# Patient Record
Sex: MALE | Race: BLACK OR AFRICAN AMERICAN | Employment: FULL TIME | URBAN - METROPOLITAN AREA
[De-identification: names, ages, dates, MRNs, and addresses within clinical notes are randomized per-mention and may not be internally consistent; named-entity substitution may affect disease eponyms.]

---

## 2021-01-29 ENCOUNTER — HOSPITAL ENCOUNTER (EMERGENCY)
Age: 46
Discharge: HOME | End: 2021-01-29
Payer: COMMERCIAL

## 2021-01-29 VITALS
HEIGHT: 74 IN | WEIGHT: 195 LBS | TEMPERATURE: 97.7 F | SYSTOLIC BLOOD PRESSURE: 145 MMHG | HEART RATE: 83 BPM | OXYGEN SATURATION: 99 % | RESPIRATION RATE: 16 BRPM | DIASTOLIC BLOOD PRESSURE: 80 MMHG | BODY MASS INDEX: 25.03 KG/M2

## 2021-01-29 DIAGNOSIS — R52 BODY ACHES: ICD-10-CM

## 2021-01-29 DIAGNOSIS — Z20.822 SUSPECTED COVID-19 VIRUS INFECTION: ICD-10-CM

## 2021-01-29 DIAGNOSIS — R05.9 COUGH: Primary | ICD-10-CM

## 2021-01-29 PROCEDURE — 99283 EMERGENCY DEPT VISIT LOW MDM: CPT

## 2021-01-29 PROCEDURE — U0003 INFECTIOUS AGENT DETECTION BY NUCLEIC ACID (DNA OR RNA); SEVERE ACUTE RESPIRATORY SYNDROME CORONAVIRUS 2 (SARS-COV-2) (CORONAVIRUS DISEASE [COVID-19]), AMPLIFIED PROBE TECHNIQUE, MAKING USE OF HIGH THROUGHPUT TECHNOLOGIES AS DESCRIBED BY CMS-2020-01-R: HCPCS

## 2021-01-29 ASSESSMENT — PAIN SCALES - GENERAL: PAINLEVEL_OUTOF10: 5

## 2021-01-29 NOTE — ED PROVIDER NOTES
[facility]        Pt Name: Shae Miller  MRN: [id number]  [place] 1975  Date of evaluation: 1/29/2021  Provider: Hiren Monterroso PA-C  PCP: No primary care provider on file. DEONNA. I have evaluated this patient. My supervising physician was available for consultation. CHIEF COMPLAINT       Chief Complaint   Patient presents with    Headache     From home. Headache and overall bodyaches, cough since yesterday morning. Contact with friend positive for covid last Sunday. HISTORY OF PRESENT ILLNESS   (Location, Timing/Onset, Context/Setting, Quality, Duration, Modifying Factors, Severity, Associated Signs and Symptoms)  Note limiting factors. Shae Mliler is a 54 y.o. male that presents to the emergency department with a chief complaint of tactile fevers, body aches, headache and cough that began yesterday. Was around a family member who tested positive for Covid yesterday and they were around him 5 days ago. Patient denies any history of CHF, COPD. Denies nausea, vomiting, shortness of breath abdominal pain, dysuria, hematuria, diarrhea, bloody stool, rash or any other symptoms. Nursing Notes were all reviewed and agreed with or any disagreements were addressed in the HPI. REVIEW OF SYSTEMS    (2-9 systems for level 4, 10 or more for level 5)     Review of Systems    Positives and Pertinent negatives as per HPI. Except as noted above in the ROS, all other systems were reviewed and negative. PAST MEDICAL HISTORY   History reviewed. No pertinent past medical history.       SURGICAL HISTORY     Past Surgical History:   Procedure Laterality Date    HAND SURGERY      tendon repair left hand inj         CURRENTMEDICATIONS       Previous Medications    CYCLOBENZAPRINE (FLEXERIL) 10 MG TABLET    Take 1 tablet by mouth 3 times daily as needed for Muscle spasms    NAPROXEN (NAPROSYN) 500 MG TABLET    Take 1 tablet

## 2021-01-29 NOTE — LETTER
[facility] Emergency Department  [address] [id number]  Phone: [phone]               January 29, 2021    Patient: Dulce Santos   YOB: 1975   Date of Visit: 1/29/2021       To Whom It May Concern:    Dulce Santos was seen and treated in our emergency department on 1/29/2021. He may return to work on reciept of a negative test. If test is positive will need to isolate for 14 days from time of symptoms starting.       Sincerely,       Mk Lombardi RN         Signature:__________________________________

## 2021-01-30 ENCOUNTER — CARE COORDINATION (OUTPATIENT)
Dept: CARE COORDINATION | Age: 46
End: 2021-01-30

## 2021-01-30 LAB — SARS-COV-2, PCR: DETECTED

## 2021-01-30 NOTE — PROGRESS NOTES
Jack Yu   54 y.o. male   1975    Virtual visit encounter type:   [] Doxy. me  [x] Telephone w/o video  [] MyChart  [] Other: This is patient's first visit with me. Trish Castro is here to establish care as their new PCP. Reasons for visit:  Chief Complaint   Patient presents with   Ruthy Trent New Doctor    Positive For Covid-19     Tested positive 1/29. Trish Castro has a PMH significant for: none    There is no problem list on file for this patient. HPI:    Patient was referred from [facility] ER after having been seen there on 1/29/2021. His vital signs were remarkable for elevated BP of 145/80 and mild tachycardia of 104 bpm.  He was afebrile with normal respiratory rate and O2 saturation 99% on room air. He did not appear acutely ill or appear in any signs of respiratory distress. No imaging or lab work of any kind was done. He was not discharged on any medications. He stated that he was exposed to COVID-19 when he went to his friend's house around Jan 24th. His friend ended up testing positive 2 days later. Patient's symptoms started on Jan 28th, which consisted of a mild cough, malaise, headache, and subjective fever. He doesn't have a thermometer at home. He stated he went to an urgent care and from what it sounds like he was not prescribed anything. He works at [employer] [address]. He did show up to work when he was sick. He later informed his manager about his COVID-19 status. Besides himself in his household is his 1year old son. The biological mother does not live with them. He has no other children. He stated that he overall feels much better compared to yesterday. He is only taking Tylenol PRN for fever. His 1year old son is overall doing well except for nasal congestion. Both have good appetite and drinking well.     Recent noteworthy labs:   -Tested positive for COVID-19 on 1/29/2021    PDM report: -Revealed no controlled medications written of any kind. No Known Allergies    No current outpatient medications on file prior to visit. No current facility-administered medications on file prior to visit. History reviewed. No pertinent family history. Social History     Tobacco Use    Smoking status: Never Smoker    Smokeless tobacco: Never Used   Substance Use Topics    Alcohol use: No        No results for input(s): WBC, HGB, HCT, MCV, PLT in the last 720 hours. Chemistry    No results found for: NA, K, CL, CO2, BUN, CREATININE No results found for: CALCIUM, ALKPHOS, AST, ALT, BILITOT       No results found for: ALT, AST, GGT, ALKPHOS, BILITOT  No results found for: LABA1C  No results found for: EAG    Review of Systems   Constitutional: Positive for activity change and fever. Negative for appetite change, chills, fatigue and unexpected weight change. HENT: Positive for congestion, rhinorrhea and sinus pressure. Negative for mouth sores, postnasal drip, sore throat, trouble swallowing and voice change. Respiratory: Positive for cough. Negative for chest tightness, shortness of breath and wheezing. Cardiovascular: Negative for chest pain, palpitations and leg swelling. Gastrointestinal: Negative for abdominal distention, abdominal pain, blood in stool, constipation, diarrhea, nausea and vomiting. Endocrine: Negative for cold intolerance and heat intolerance. Genitourinary: Negative for difficulty urinating, dysuria, frequency and hematuria. Musculoskeletal: Positive for arthralgias and myalgias. Negative for back pain. Skin: Negative for color change and rash. Neurological: Positive for headaches. Negative for dizziness, weakness, light-headedness and numbness. Negative for change/loss of taste. Negative for change/loss of smell. Psychiatric/Behavioral: Negative for sleep disturbance.      Wt Readings from Last 3 Encounters:   01/29/21 195 lb (88.5 kg) 08/15/17 205 lb (93 kg)       BP Readings from Last 3 Encounters:   01/29/21 (!) 145/80   08/15/17 137/80       Pulse Readings from Last 3 Encounters:   01/29/21 83   08/15/17 57       There were no vitals taken for this visit. Physical Exam  Unable to assess due to telephone w/o video encounter only    Assessment/Plan:   Jack was seen today for established new doctor and positive for covid-19. Diagnoses and all orders for this visit:    Encounter to establish care with new doctor    COVID-19 virus infection  -     vitamin D (ERGOCALCIFEROL) 1.25 MG (21031 UT) CAPS capsule; Take 1 capsule by mouth every 7 days  -     benzonatate (TESSALON) 100 MG capsule; Take 1 capsule by mouth 3 times daily as needed for Cough  -     brompheniramine-pseudoephedrine-DM 2-30-10 MG/5ML syrup; Take 5 mLs by mouth 4 times daily as needed for Congestion or Cough    Educated about COVID-19 virus infection       I reviewed the plan of care with the patient. Patient acknowledged understanding and agreed with plan of care overall.     Medications Discontinued During This Encounter   Medication Reason    cyclobenzaprine (FLEXERIL) 10 MG tablet LIST CLEANUP    naproxen (NAPROSYN) 500 MG tablet LIST CLEANUP Martha Frankel is a 54 y.o. male is being evaluated by a virtual visit (video visit) and/or telephone (without video) encounter to address their concern(s) as mentioned above. Prior to arranging this appointment, the patient was made aware of the need and importance to schedule the visit in this manner given the current ongoing COVID-19 pandemic. The patient was also made aware that the telemedicine service used (e.g.doxy. me) would not save let alone record any information (audio, video, and text) regarding the actual virtual visit. After this discussion, the patient acknowledged understanding and agreed to today's virtual visit encounter. A caregiver was present when appropriate as well as an  if requested. Due to this being a TeleHealth encounter (during the [facility]- public health emergency), evaluation of the following organ systems was overall limited: Vitals/Constitutional/EENT/Resp/CV/GI//MS/Neuro/Skin/Heme-Lymph-Imm. As a result, establishing a diagnosis(s) and formulating a plan of care may be overall more difficult and complicated compared to a conventional (face-to-face) office visit. The patient was made aware about the potential limitations and difficulties of a telemedicine visit such as having technical difficulties related to video and/or audio issues often stemming from a sub-optimal/poor Internet or cellular data connection and/or other factors. If this is judged to be the case then the patient would be informed if deemed relevant and necessary that an in-person follow-up visit may be recommended. Pursuant to the emergency declaration under the [facility], [address] [place] [address] authority and the [employer] Act, this virtual visit was conducted with the patient's (and/or legal guardian's) consent, to reduce the patient's risk (as well as others) of exposure to COVID-19 and to continue to maintain and provide necessary medical care. The patient (and/or legal guardian) has also been advised to contact this office for worsening conditions or problems, and seek emergency medical treatment and/or call 911 if deemed necessary. Services were provided through either a video synchronous discussion virtually or via telephone (without video) or combination of both to substitute for in-person clinic visit. Patient and provider were located at their individual home(s) or their most convenient location at the time of visit. Regarding my note: This note was composed to the best of my knowledge and recollection of the encounter with the patient using one of my own customized note templates utilizing a combination of typing and dictating with the [address] [place] [address] speech recognition software. As a result, the note may possibly have various errors (e.g. spelling, grammar, and non-sensible words/phrases/statements) despite reviewing the note prior to signing it for completion. It is worth noting that most of the time, notes are completed usually long after the patient is seen and are strived to be completed in a timely fashion (ideally within 72 hours of the patient encounter). It is also worth noting that healthcare providers often see several patients a day (e.g. > 15-30 patients/day) in either the outpatient and/or inpatient setting(s). In addition, healthcare providers spend a significant amount of time reviewing multiple patients' charts in preparation for their future encounters (pre-charting) as well as time spent reviewing any labs, imaging, specialist's notes (if relevant), and other documents (e.g. recent ER visits or hospital stays or completing forms such as FMLA, short-term/long-term disability), etc.  Time and effort is also allocated to coordinating with office staff to make sure various things are completed as part of the day-to-day office management responsibilities. Given all this, it is worth pointing out that not every detail can be remembered and not everything discussed is considered relevant, significant, or noteworthy. If one has any questions or concerns about my given note, please take into consideration all these aforementioned things before contacting. Reggie Kellogg M.D.   [id number]    Electronically signed by Bita Thakur on 2/2/2021 at 10:10 AM.

## 2021-01-30 NOTE — CARE COORDINATION
[facility]". Discussed COVID-19 related testing which was available at this time. Test results were positive. Patient informed of results, if available? Yes    Care Transition Nurse/ Ambulatory Care Manager contacted the patient by telephone to perform post discharge assessment. Call within 2 business days of discharge: Yes. Verified name and  with patient as identifiers. Provided introduction to self, and explanation of the CTN/ACM role, and reason for call due to risk factors for infection and/or exposure to COVID-19. Symptoms reviewed with patient who verbalized the following symptoms: pain or aching joints, less urine output and headache. Due to no new or worsening symptoms encounter was not routed to provider for escalation. Discussed follow-up appointments. If no appointment was previously scheduled, appointment scheduling offered: Yes  [facility] follow up appointment(s): No future appointments. Non-[facility] follow up appointment(s):     3 way call to patient/ [facility] to establish w PCP and schedule follow up appt from ER    Non-face-to-face services provided:  Scheduled appointment with PCP-per 3 way call to [facility]  Obtained and reviewed discharge summary and/or continuity of care documents  Reviewed and followed up on pending diagnostic tests and treatments-covid  Education of patient/family/caregiver/guardian to support self-management-cdc guidelines, loop enrollment     Advance Care Planning:   Does patient have an Advance Directive:  not on file. Patient has following risk factors of: no known risk factors. CTN/ACM reviewed discharge instructions, medical action plan and red flags such as increased shortness of breath, increasing fever and signs of decompensation with patient who verbalized understanding. Discussed exposure protocols and quarantine with CDC Guidelines What to do if you are sick with coronavirus disease .  Patient was given an

## 2021-01-30 NOTE — CARE COORDINATION
Patient was seen in ED on 2021 for headache, body ache, and cough. His son was also seen in the ED. They were exposed to a COVID-19 positive person. EMERGENCY DEPARTMENT COURSE and DIFFERENTIAL DIAGNOSIS/MDM:  Patient presented with cough, body aches with recent Covid exposure. Son has similar symptoms is here with him. Has a CMT COVID-19 risk of decompensation score within 24 hours of 0. Do not believe any work-up here besides Covid swab is warranted. Lung sounds are clear to auscultation. Low suspicion for pulmonary embolus, pneumothorax, bacterial pneumonia or other emergent etiology. Was educated on symptomatic treatment at home. Return here for any worsening of symptoms or problems at home. FINAL IMPRESSION       1. Cough    2. Body aches    3. Suspected COVID-19 virus infection      Phoned Patient for ED follow up/COVID precautions. Patient contacted regarding COVID-19 exposure. Discussed COVID-19 related testing which was pending at this time. Test results were pending. Patient informed of results, if available? pending    Care Transition Nurse/ Ambulatory Care Manager contacted the patient by telephone to perform post discharge assessment. Call within 2 business days of discharge: Yes. Verified name and  with patient as identifiers. Provided introduction to self, and explanation of the CTN/ACM role, and reason for call due to risk factors for infection and/or exposure to COVID-19. Symptoms reviewed with patient who verbalized the following symptoms: pain or aching joints and complains of body aches and headache. Due to no new or worsening symptoms encounter was not routed to provider for escalation. Discussed follow-up appointments. If no appointment was previously scheduled, appointment scheduling offered: No and Patient doesn't have a PCP. He was given [employer] scheduling assistance phone number, [phone], to schedule a new Patient appointment once out of quarantine. [facility] No future appointments. Non-[facility] follow up appointment(s):     Non-face-to-face services provided:  Obtained and reviewed discharge summary and/or continuity of care documents  Patient given information for [address]:   Does patient have an Advance Directive:  patient declined education. He doesn't feel well at this time. Patient has following risk factors of: no known risk factors. CTN/ACM reviewed discharge instructions, medical action plan and red flags such as increased shortness of breath, increasing fever and signs of decompensation with patient who verbalized understanding. Discussed exposure protocols and quarantine with CDC Guidelines What to do if you are sick with coronavirus disease 2019.  Patient was given an opportunity for questions and concerns. The patient agrees to contact the [employer] exposure line [phone], [facility] department [place] [facility]: ([phone]) and PCP office for questions related to their healthcare. CTN/ACM provided contact information for future needs. Reviewed and educated patient on any new and changed medications related to discharge diagnosis     Patient/family/caregiver given information for [facility] Loop and agrees to enroll yes  Patient's preferred e-mail: [email or web address]   Patient's preferred phone number: [phone]  Based on Loop alert triggers, patient will be contacted by nurse care manager for worsening symptoms. Pt will be further monitored by COVID Loop Team based on severity of symptoms and risk factors.

## 2021-02-01 ENCOUNTER — VIRTUAL VISIT (OUTPATIENT)
Dept: FAMILY MEDICINE CLINIC | Age: 46
End: 2021-02-01

## 2021-02-01 DIAGNOSIS — Z76.89 ENCOUNTER TO ESTABLISH CARE WITH NEW DOCTOR: Primary | ICD-10-CM

## 2021-02-01 DIAGNOSIS — U07.1 COVID-19 VIRUS INFECTION: ICD-10-CM

## 2021-02-01 DIAGNOSIS — Z71.89 EDUCATED ABOUT COVID-19 VIRUS INFECTION: ICD-10-CM

## 2021-02-01 PROCEDURE — 99442 PR PHYS/QHP TELEPHONE EVALUATION 11-20 MIN: CPT | Performed by: FAMILY MEDICINE

## 2021-02-01 RX ORDER — BENZONATATE 100 MG/1
100 CAPSULE ORAL 3 TIMES DAILY PRN
Qty: 15 CAPSULE | Refills: 1 | Status: SHIPPED | OUTPATIENT
Start: 2021-02-01

## 2021-02-01 RX ORDER — ERGOCALCIFEROL 1.25 MG/1
50000 CAPSULE ORAL
Qty: 4 CAPSULE | Refills: 2 | Status: SHIPPED | OUTPATIENT
Start: 2021-02-01

## 2021-02-01 RX ORDER — BROMPHENIRAMINE MALEATE, PSEUDOEPHEDRINE HYDROCHLORIDE, AND DEXTROMETHORPHAN HYDROBROMIDE 2; 30; 10 MG/5ML; MG/5ML; MG/5ML
5 SYRUP ORAL 4 TIMES DAILY PRN
Qty: 473 ML | Refills: 0 | Status: SHIPPED | OUTPATIENT
Start: 2021-02-01

## 2021-02-01 ASSESSMENT — ENCOUNTER SYMPTOMS
NAUSEA: 0
COUGH: 1
SINUS PRESSURE: 1
SHORTNESS OF BREATH: 0
VOMITING: 0
CHEST TIGHTNESS: 0
RHINORRHEA: 1
DIARRHEA: 0

## 2021-02-01 ASSESSMENT — PATIENT HEALTH QUESTIONNAIRE - PHQ9
2. FEELING DOWN, DEPRESSED OR HOPELESS: 0
SUM OF ALL RESPONSES TO PHQ9 QUESTIONS 1 & 2: 0
SUM OF ALL RESPONSES TO PHQ QUESTIONS 1-9: 0
1. LITTLE INTEREST OR PLEASURE IN DOING THINGS: 0

## 2021-02-02 ASSESSMENT — ENCOUNTER SYMPTOMS
BLOOD IN STOOL: 0
ABDOMINAL PAIN: 0
COLOR CHANGE: 0
TROUBLE SWALLOWING: 0
VOICE CHANGE: 0
ABDOMINAL DISTENTION: 0
CONSTIPATION: 0
WHEEZING: 0
BACK PAIN: 0
SORE THROAT: 0

## 2021-02-11 ENCOUNTER — TELEPHONE (OUTPATIENT)
Dept: FAMILY MEDICINE CLINIC | Age: 46
End: 2021-02-11

## 2021-02-11 NOTE — TELEPHONE ENCOUNTER
Pt called stating he went to [facility] on 2/9/21 to have covid test done to return to work and it came back positive. Initial positive was on 1/29/21. Pt states his employer will not let him return to work with a positive result unless a doctor note is submitted. Pt states he has no symptoms at all and he feels fine to return to work. He also states that the home where he was previously exposed are all testing negative now. Please advise, should pt remain quarantined or ok to return to work.

## 2021-07-28 ENCOUNTER — APPOINTMENT (OUTPATIENT)
Dept: GENERAL RADIOLOGY | Age: 46
End: 2021-07-28
Payer: COMMERCIAL

## 2021-07-28 ENCOUNTER — HOSPITAL ENCOUNTER (EMERGENCY)
Age: 46
Discharge: HOME | End: 2021-07-28
Payer: COMMERCIAL

## 2021-07-28 VITALS
SYSTOLIC BLOOD PRESSURE: 163 MMHG | HEART RATE: 73 BPM | BODY MASS INDEX: 26.73 KG/M2 | TEMPERATURE: 97.7 F | WEIGHT: 208.3 LBS | OXYGEN SATURATION: 99 % | HEIGHT: 74 IN | DIASTOLIC BLOOD PRESSURE: 67 MMHG | RESPIRATION RATE: 18 BRPM

## 2021-07-28 DIAGNOSIS — S93.491A SPRAIN OF ANTERIOR TALOFIBULAR LIGAMENT OF RIGHT ANKLE, INITIAL ENCOUNTER: Primary | ICD-10-CM

## 2021-07-28 PROCEDURE — 6370000000 HC RX 637 (ALT 250 FOR IP): Performed by: PHYSICIAN ASSISTANT

## 2021-07-28 PROCEDURE — 99283 EMERGENCY DEPT VISIT LOW MDM: CPT

## 2021-07-28 PROCEDURE — 73610 X-RAY EXAM OF ANKLE: CPT

## 2021-07-28 RX ORDER — NAPROXEN 250 MG/1
500 TABLET ORAL ONCE
Status: COMPLETED | OUTPATIENT
Start: 2021-07-28 | End: 2021-07-28

## 2021-07-28 RX ORDER — NAPROXEN 500 MG/1
500 TABLET ORAL 2 TIMES DAILY PRN
Qty: 20 TABLET | Refills: 0 | Status: SHIPPED | OUTPATIENT
Start: 2021-07-28 | End: 2021-08-07

## 2021-07-28 RX ADMIN — NAPROXEN 500 MG: 250 TABLET ORAL at 13:26

## 2021-07-28 ASSESSMENT — PAIN SCALES - GENERAL
PAINLEVEL_OUTOF10: 5
PAINLEVEL_OUTOF10: 5

## 2021-07-28 ASSESSMENT — PAIN DESCRIPTION - ORIENTATION: ORIENTATION: RIGHT

## 2021-07-28 ASSESSMENT — ENCOUNTER SYMPTOMS
NAUSEA: 0
VOMITING: 0
CHEST TIGHTNESS: 0
DIARRHEA: 0
SHORTNESS OF BREATH: 0
ABDOMINAL PAIN: 0

## 2021-07-28 ASSESSMENT — PAIN DESCRIPTION - LOCATION: LOCATION: ANKLE

## 2021-07-28 NOTE — ED PROVIDER NOTES
[address] [facility]        Pt Name: Diann Jean-Baptiste  MRN: [id number]  [place] 1975  Date of evaluation: 7/28/2021  Provider: Breann Jamison PA-C  PCP: Peter Pak  Note Started: 1:22 PM EDT       DEONNA. I have evaluated this patient. My supervising physician was available for consultation. CHIEF COMPLAINT       Chief Complaint   Patient presents with    Ankle Pain     Reports twisting right ankle between stairs while at work  today       HISTORY OF PRESENT ILLNESS   (Location, Timing/Onset, Context/Setting, Quality, Duration, Modifying Factors, Severity, Associated Signs and Symptoms)  Note limiting factors. Chief Complaint: Right ankle injury    Diann Jean-Baptiste is a 51 y.o. male who presents to the emergency department with reports of an injury accident that occurred today at work. Patient is an [address] . 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 He states he was coming out of the steps from the side door and sustained an inversion mechanism injury. He did not fall the ground he did not hit his head he did not blackout or lose consciousness and this is an isolated complaint of his right ankle. He has had increasing levels of pain and some swelling. He reports to me he is able to independently ambulate but does so with discomfort. He contacted his supervisor who suggested he come to the ED for evaluation and treatment. His current level of pain and discomfort of the lateral complex of the right ankle rates to be 5 out of 10. He is done nothing yet for the above-mentioned secondary the acuity injury. He states that this is an isolated complaint of the lateral complex of the right ankle and presents the ED for evaluation and treatment of the above-mentioned. Nursing Notes were all reviewed and agreed with or any disagreements were addressed in the HPI.     REVIEW OF SYSTEMS    (2-9 systems for level 4, 10 or more for level 5)     Review of Systems   Constitutional: Negative for activity change, chills and fever. Respiratory: Negative for chest tightness and shortness of breath. Cardiovascular: Negative for chest pain. Gastrointestinal: Negative for abdominal pain, diarrhea, nausea and vomiting. Genitourinary: Negative for dysuria and flank pain. Musculoskeletal: Positive for arthralgias and gait problem. Skin: Negative for rash and wound. Neurological: Negative for seizures, syncope and numbness. Positives and Pertinent negatives as per HPI. Except as noted above in the ROS, all other systems were reviewed and negative. PAST MEDICAL HISTORY     Past Medical History:   Diagnosis Date    COVID-19 01/2020         SURGICAL HISTORY     Past Surgical History:   Procedure Laterality Date    HAND SURGERY      tendon repair left hand inj         CURRENTMEDICATIONS       Discharge Medication List as of 7/28/2021  2:20 PM      CONTINUE these medications which have NOT CHANGED    Details   vitamin D (ERGOCALCIFEROL) 1.25 MG (16498 UT) CAPS capsule Take 1 capsule by mouth every 7 days, Disp-4 capsule, R-2Normal      benzonatate (TESSALON) 100 MG capsule Take 1 capsule by mouth 3 times daily as needed for Cough, Disp-15 capsule, R-1Normal      brompheniramine-pseudoephedrine-DM 2-30-10 MG/5ML syrup Take 5 mLs by mouth 4 times daily as needed for Congestion or Cough, Disp-473 mL, R-0Normal               ALLERGIES     Patient has no known allergies. FAMILYHISTORY     History reviewed. No pertinent family history.        SOCIAL HISTORY       Social History     Tobacco Use    Smoking status: Never Smoker    Smokeless tobacco: Never Used   Vaping Use    Vaping Use: Never used   Substance Use Topics    Alcohol use: No    Drug use: Never       SCREENINGS             PHYSICAL EXAM    (up to 7 for level 4, 8 or more for level 5)     ED Triage Vitals [07/28/21 1313]   BP Temp Temp Source Pulse Resp SpO2 Height Weight   (!) 163/67 97.7 °F (36.5 °C) Oral 73 18 99 % 6' 2\" (1.88 m) 208 lb 4.8 oz (94.5 kg)       Physical Exam  Vitals and nursing note reviewed. Constitutional:       General: He is not in acute distress. Appearance: He is well-developed. He is not diaphoretic. HENT:      Head: Normocephalic and atraumatic. Right Ear: External ear normal.      Left Ear: External ear normal.   Eyes:      General: No scleral icterus. Right eye: No discharge. Left eye: No discharge. Conjunctiva/sclera: Conjunctivae normal.   Neck:      Vascular: No JVD. Cardiovascular:      Rate and Rhythm: Normal rate and regular rhythm. Heart sounds: No murmur heard. No friction rub. No gallop. Pulmonary:      Effort: Pulmonary effort is normal. No accessory muscle usage or respiratory distress. Breath sounds: Normal breath sounds. No wheezing, rhonchi or rales. Musculoskeletal:      Cervical back: Normal range of motion. Right ankle: Tenderness present over the lateral malleolus and ATF ligament. No medial malleolus, AITF ligament, CF ligament, posterior TF ligament, base of 5th metatarsal or proximal fibula tenderness. Decreased range of motion. Anterior drawer test negative. Normal pulse. Right Achilles Tendon: Normal.   Skin:     General: Skin is warm and dry. Neurological:      Mental Status: He is alert and oriented to person, place, and time. GCS: GCS eye subscore is 4. GCS verbal subscore is 5. GCS motor subscore is 6. Cranial Nerves: No cranial nerve deficit. Sensory: No sensory deficit. Coordination: Coordination normal.   Psychiatric:         Behavior: Behavior normal.         DIAGNOSTIC RESULTS   LABS:    Labs Reviewed - No data to display    When ordered only abnormal lab results are displayed. All other labs were within normal range or not returned as of this dictation. EKG:  When ordered, EKG's are interpreted by the Emergency Department Physician in the absence of a cardiologist.  Please see their note for interpretation of EKG. RADIOLOGY:   Non-plain film images such as CT, Ultrasound and MRI are read by the radiologist. Plain radiographic images are visualized and preliminarily interpreted by the ED Provider with the below findings:        Interpretation per the Radiologist below, if available at the time of this note:    XR ANKLE RIGHT (MIN 3 VIEWS)   Final Result   Negative             PROCEDURES   Unless otherwise noted below, none     Procedures    CRITICAL CARE TIME   N/A    CONSULTS:  None      EMERGENCY DEPARTMENT COURSE and DIFFERENTIAL DIAGNOSIS/MDM:   Vitals:    Vitals:    07/28/21 1313   BP: (!) 163/67   Pulse: 73   Resp: 18   Temp: 97.7 °F (36.5 °C)   TempSrc: Oral   SpO2: 99%   Weight: 208 lb 4.8 oz (94.5 kg)   Height: 6' 2\" (1.88 m)       Patient was given the following medications:  Medications   naproxen (NAPROSYN) tablet 500 mg (500 mg Oral Given 7/28/21 1326)           The patient's detailed history of present illness is documented as above. Upon arrival to the emergency department the patient's vital signs are as documented. The patient is noted to be hemodynamically stable and afebrile. Physical examination findings are as above. Symptomatology was treated as above. Radiographs of the right ankle demonstrate no evidence of acute fracture no dislocation no significant soft tissue swelling. Clinically this is a mild sprain. I do believe he can be placed in an ankle Aircast and follow-up on an outpatient basis without this being a lifetime injury. Over-the-counter anti-inflammatory medications and Tylenol as needed. He is aware that if for whatever reason he cannot return to his regular usual occupation tomorrow as we have planned today that he will need to follow-up with [facility] on an outpatient basis. Strict potential instructions for return.  The patient has been made aware of the signs and symptoms which would necessitate an immediate return to the emergency department and verbalizes an understanding of these signs and symptoms. I estimate there is low risk for compartment syndrome, deep venous thrombosis, limb-threatening infectious, tendon and/or neurovascular injury and therefore I consider the discharge disposition reasonable. Jack Yu and I have discussed the diagnosis and risks, and we agree with discharging home to follow-up with their primary care provider and/or the referring orthopedist on call. We also discussed returning to the emergency department immediately if new or worsening symptoms occur. We have discussed the symptoms which are most concerning (e.g., changing or worsening pain, numbness, weakness) that necessitate immediate return. FINAL IMPRESSION      1.  Sprain of anterior talofibular ligament of right ankle, initial encounter          DISPOSITION/PLAN   DISPOSITION: Discharged to home      PATIENT REFERRED TO:  *[address] [address]      Jesica Re need to call to arrange follow-up if you cannot return to your regular and usual occupation tomorrow as planned    [facility] Emergency Department  [address] [facility]  [phone]    If symptoms worsen      DISCHARGE MEDICATIONS:  Discharge Medication List as of 7/28/2021  2:20 PM      START taking these medications    Details   naproxen (NAPROSYN) 500 MG tablet Take 1 tablet by mouth 2 times daily as needed for Pain, Disp-20 tablet, R-0Print             DISCONTINUED MEDICATIONS:  Discharge Medication List as of 7/28/2021  2:20 PM               (Please note that portions of this note were completed with a voice recognition program.  Efforts were made to edit the dictations but occasionally words are mis-transcribed.)    Jostin Romero PA-C (electronically signed)           Marcel Young PA-C  07/28/21 5824

## 2021-10-24 NOTE — TELEPHONE ENCOUNTER
Tomorrow would alexander 14 days when he first tested positive. If he's asymptomatic including no fever for at least 24 hours (without any use of Ibuprofen, Naproxen, Tylenol), he's not feeling SOB, and overall doing fine then he may return to work without any work restrictions. Please inform him that he's no longer consider infectious. If his work does require him to produce a negative test then that is his work's policy so he should verify his work about their policy with returning to work after COVID-19 infection. Reggie Cook 177 Statement Selected Calm/Appropriate

## 2023-11-11 ENCOUNTER — HOSPITAL ENCOUNTER (EMERGENCY)
Age: 48
Discharge: HOME | End: 2023-11-11
Payer: COMMERCIAL

## 2023-11-11 ENCOUNTER — APPOINTMENT (OUTPATIENT)
Dept: GENERAL RADIOLOGY | Age: 48
End: 2023-11-11
Payer: COMMERCIAL

## 2023-11-11 VITALS
RESPIRATION RATE: 20 BRPM | SYSTOLIC BLOOD PRESSURE: 160 MMHG | TEMPERATURE: 98.2 F | HEART RATE: 60 BPM | BODY MASS INDEX: 25.78 KG/M2 | OXYGEN SATURATION: 94 % | WEIGHT: 200.8 LBS | DIASTOLIC BLOOD PRESSURE: 94 MMHG

## 2023-11-11 DIAGNOSIS — S82.892A CLOSED FRACTURE OF LEFT ANKLE, INITIAL ENCOUNTER: Primary | ICD-10-CM

## 2023-11-11 PROCEDURE — 73610 X-RAY EXAM OF ANKLE: CPT

## 2023-11-11 PROCEDURE — 6370000000 HC RX 637 (ALT 250 FOR IP): Performed by: PHYSICIAN ASSISTANT

## 2023-11-11 PROCEDURE — 99283 EMERGENCY DEPT VISIT LOW MDM: CPT

## 2023-11-11 RX ORDER — IBUPROFEN 400 MG/1
400 TABLET ORAL ONCE
Status: COMPLETED | OUTPATIENT
Start: 2023-11-11 | End: 2023-11-11

## 2023-11-11 RX ORDER — NAPROXEN 500 MG/1
500 TABLET ORAL 2 TIMES DAILY
Qty: 20 TABLET | Refills: 0 | Status: SHIPPED | OUTPATIENT
Start: 2023-11-11 | End: 2023-11-21

## 2023-11-11 RX ADMIN — IBUPROFEN 400 MG: 400 TABLET, FILM COATED ORAL at 18:22

## 2023-11-11 ASSESSMENT — PAIN DESCRIPTION - ORIENTATION: ORIENTATION: LEFT

## 2023-11-11 ASSESSMENT — PAIN - FUNCTIONAL ASSESSMENT
PAIN_FUNCTIONAL_ASSESSMENT: 0-10
PAIN_FUNCTIONAL_ASSESSMENT: 0-10

## 2023-11-11 ASSESSMENT — ENCOUNTER SYMPTOMS
COLOR CHANGE: 0
SHORTNESS OF BREATH: 0
BACK PAIN: 0

## 2023-11-11 ASSESSMENT — PAIN SCALES - GENERAL
PAINLEVEL_OUTOF10: 7
PAINLEVEL_OUTOF10: 4

## 2023-11-11 ASSESSMENT — PAIN DESCRIPTION - LOCATION: LOCATION: FOOT

## 2023-11-12 NOTE — ED NOTES
PT discharged at this time in stable condition. Reviewed AVS with PT, provided discharge education and instructions including return precautions and follow up care. PT verbalized understanding, stated no further questions or concerns at this time.        Vijaya Plummer RN  11/11/23 7838

## 2023-11-14 ENCOUNTER — TELEPHONE (OUTPATIENT)
Dept: ORTHOPEDIC SURGERY | Age: 48
End: 2023-11-14

## 2023-11-14 ENCOUNTER — OFFICE VISIT (OUTPATIENT)
Dept: ORTHOPEDIC SURGERY | Age: 48
End: 2023-11-14
Payer: COMMERCIAL

## 2023-11-14 DIAGNOSIS — S93.402A SPRAIN OF LEFT ANKLE, UNSPECIFIED LIGAMENT, INITIAL ENCOUNTER: Primary | ICD-10-CM

## 2023-11-14 PROCEDURE — 99204 OFFICE O/P NEW MOD 45 MIN: CPT | Performed by: ORTHOPAEDIC SURGERY

## 2023-11-14 RX ORDER — METHYLPREDNISOLONE 4 MG/1
TABLET ORAL
Qty: 1 KIT | Refills: 0 | Status: SHIPPED | OUTPATIENT
Start: 2023-11-14

## 2023-11-14 NOTE — PROGRESS NOTES
ongoing left ankle pain today. We discussed his diagnosis of lateral ankle sprain. At this point he is having no difficulty ambulating. We discussed that he may continue wearing his walking boot for 1 to 2 weeks as needed, or he may discontinue the boot altogether. He may do what ever is most comfortable. I will get him started on a Medrol Dosepak for his pain and inflammation. I also recommended a one-time visit to physical therapy to learn a ankle sprain program.  He understands that this may take a few weeks to a couple months to fully heal.  He voiced understanding and is in agreement the plan. He may follow-up with me on an as-needed basis. Electronically signed by Katie Blanton PA-C on 11/14/23     Disclaimer: This note was dictated with voice recognition software. Though review and correction are routine, we apologize for any errors. I have seen and examined the patient. I agree with the above assessment and plan. Greater than 45 minutes were spent with this encounter. Time spent included evaluating the patient's chart prior to arrival.  Evaluating the patient in the office including history, physical examination, imaging reviewing, and counseling on next steps. Lastly, time was spent discussing orders with my staff as well as providing documentation in the chart.